# Patient Record
Sex: MALE | Race: OTHER | Employment: UNEMPLOYED | URBAN - METROPOLITAN AREA
[De-identification: names, ages, dates, MRNs, and addresses within clinical notes are randomized per-mention and may not be internally consistent; named-entity substitution may affect disease eponyms.]

---

## 2018-03-13 ENCOUNTER — APPOINTMENT (OUTPATIENT)
Dept: GENERAL RADIOLOGY | Age: 38
End: 2018-03-13
Attending: PHYSICIAN ASSISTANT
Payer: SELF-PAY

## 2018-03-13 ENCOUNTER — HOSPITAL ENCOUNTER (EMERGENCY)
Age: 38
Discharge: HOME | End: 2018-03-13
Attending: EMERGENCY MEDICINE
Payer: SELF-PAY

## 2018-03-13 VITALS
TEMPERATURE: 97.5 F | WEIGHT: 147.71 LBS | HEART RATE: 74 BPM | DIASTOLIC BLOOD PRESSURE: 70 MMHG | OXYGEN SATURATION: 100 % | RESPIRATION RATE: 16 BRPM | BODY MASS INDEX: 22.39 KG/M2 | HEIGHT: 68 IN | SYSTOLIC BLOOD PRESSURE: 105 MMHG

## 2018-03-13 DIAGNOSIS — S50.811A ABRASION OF RIGHT FOREARM, INITIAL ENCOUNTER: ICD-10-CM

## 2018-03-13 DIAGNOSIS — S50.11XA CONTUSION OF RIGHT FOREARM, INITIAL ENCOUNTER: Primary | ICD-10-CM

## 2018-03-13 PROCEDURE — 99282 EMERGENCY DEPT VISIT SF MDM: CPT

## 2018-03-13 PROCEDURE — 73090 X-RAY EXAM OF FOREARM: CPT

## 2018-03-13 PROCEDURE — 90471 IMMUNIZATION ADMIN: CPT

## 2018-03-13 PROCEDURE — 74011250636 HC RX REV CODE- 250/636: Performed by: PHYSICIAN ASSISTANT

## 2018-03-13 PROCEDURE — 90715 TDAP VACCINE 7 YRS/> IM: CPT | Performed by: PHYSICIAN ASSISTANT

## 2018-03-13 RX ORDER — IBUPROFEN 600 MG/1
600 TABLET ORAL
Qty: 20 TAB | Refills: 0 | Status: SHIPPED | OUTPATIENT
Start: 2018-03-13

## 2018-03-13 RX ADMIN — TETANUS TOXOID, REDUCED DIPHTHERIA TOXOID AND ACELLULAR PERTUSSIS VACCINE, ADSORBED 0.5 ML: 5; 2.5; 8; 8; 2.5 SUSPENSION INTRAMUSCULAR at 12:36

## 2018-03-13 NOTE — ED NOTES
Patient reports to ED c/o right forearm pain. Patient is Chilean speaking but female friend translates per request. Reports falling on ice this AM and hurting right forearm. Abrasion and tenderness noted. Emergency Department Nursing Plan of Care       The Nursing Plan of Care is developed from the Nursing assessment and Emergency Department Attending provider initial evaluation. The plan of care may be reviewed in the ED Provider note.     The Plan of Care was developed with the following considerations:   Patient / Family readiness to learn indicated by:verbalized understanding  Persons(s) to be included in education: patient  Barriers to Learning/Limitations:No    Signed     Elif Lara RN    3/13/2018   11:42 AM

## 2018-03-13 NOTE — DISCHARGE INSTRUCTIONS
Raspones (excoriaciones): Instrucciones de cuidado - [ [place] (Abrasions): Care Instructions ]  Instrucciones de cuidado  Los raspones (excoriaciones) son heridas en donde la piel ha sido frotada o arrancada. La mayoría de los raspones no penetran mucho en la piel, casie algunos pueden llegar a desprender varias capas de piel. Los raspones no suelen sangrar mucho, casie pueden supurar un líquido rosáceo. Los raspones en la lolly o en la keena pueden parecer peor de lo que son. Pueden sangrar mucho debido a la buena irrigación sanguínea de estas zonas. La mayoría de los raspones sanan phu y es posible que no requieran un vendaje. Suelen sanar dentro de 3 a 7 días. Un raspón vignesh y profundo puede tardar de 1 a 2 semanas o más en sanar. En algunos raspones se puede formar Tanja Garibay. La atención de seguimiento es kandace parte clave de kiran tratamiento y seguridad. Asegúrese de hacer y acudir a todas las citas, y llame a kiran médico si está teniendo problemas. También es kandace buena idea saber los resultados de los exámenes y mantener kandace lista de los medicamentos que [place]. ¿Cómo puede cuidarse en el hogar? · Si kiran médico le dijo cómo cuidarse la herida, siga las instrucciones de kiran médico. Si no le elvie instrucciones, siga estos consejos generales:  ¨ Lávese el raspón con agua limpia 2 veces al día. No use peróxido de hidrógeno (agua [place]) ni alcohol, los cuales pueden retrasar la sanación. ¨ Puede cubrirse el raspón con kandace capa delgada de vaselina y [place] venda no adherente. ¨ Aplíquese más vaselina y [place] la venda según sea necesario. · Mantenga elevada la mirela lesionada sobre kandace almohada en cualquier momento que se siente o se acueste gaby los 3 días siguientes. Trate de mantener la mirela por encima del nivel del corazón. [place] ayudará a reducir la hinchazón. · Sea meghan con los medicamentos. [place] los analgésicos (medicamentos para el dolor) exactamente según las indicaciones.   ¨ Si el [employer] recetó un analgésico, tómelo según las indicaciones. ¨ Si no está tomando un analgésico recetado, pregúntele a kiran médico si puede rasta tee de [employer]. ¿Cuándo debe pedir ayuda? Llame a kiran médico ahora mismo o busque atención médica inmediata si:  ? · Tiene señales de infección, tales vladislav:  ¨ Aumento del dolor, la hinchazón, el enrojecimiento o la temperatura alrededor del raspón. ¨ Vetas rojizas que salen del raspón. ¨ Pus que sale del raspón. Alda [place]. ? · El raspón comienza a sangrar, y la boris empapa el vendaje. Es normal que salgan pequeñas cantidades de [place]. ?Preste especial atención a los cambios en kiran adriel y asegúrese de comunicarse con kiran médico si el raspón no mejora día a día. ¿Dónde puede encontrar más información en inglés? Kannan Phan a [email or web address] Escriba A374 en la búsqueda para aprender más acerca de \"Raspones (excoriaciones): Instrucciones de cuidado - [ [place] (Abrasions): Care Instructions ]. \"  Revisado: 20 Edelmirane Hamper 2017  Versión del contenido: 11.4  © 8091-3377 Healthwise, Incorporated. Las instrucciones de cuidado fueron adaptadas bajo licencia por [employer] (which disclaims liability or warranty for this information). Si usted tiene [place] afección médica o sobre estas instrucciones, siempre pregunte a kiran profesional de adriel. Healthwise, Incorporated niega toda garantía o responsabilidad por kiran uso de esta información. Dolor en el Lucyann Amy: Instrucciones de cuidado - [ Arm Pain: Care Instructions ]  Instrucciones de cuidado    Usted puede lastimar kiran brazo si lo [place] en exceso o se lesiona. Raegan [place] de actividades que pudieran provocar dolor de brazo incluyen montar en bicicleta, practicar cher y hacer proyectos de reparación en el hogar. El desgaste cotidiano, especialmente al envejecer, también puede causar dolor en el brazo.  Las partes de kiran brazo con mayor probabilidad de sentir dolor son Judge Nicholas antebrazos, las [address], las richard y los dedos. En general, las lesiones leves del brazo sanan por sí solas con tratamiento en el hogar para aliviar la hinchazón y el dolor. Yolanda si tiene kandace lesión más grave, quizás necesite exámenes y [place]. La atención de seguimiento es kandace parte clave de seth tratamiento y seguridad. Asegúrese de hacer y acudir a todas las citas, y llame a seth médico si está teniendo problemas. También es kandace buena idea saber los resultados de los exámenes y mantener kandace lista de los medicamentos que [place]askia. ¿Cómo puede cuidarse en el hogar? · [employer] analgésicos (medicamentos para el dolor) exactamente vladislav le fueron indicados. ¨ Si el médico le recetó un analgésico, tómelo según las indicaciones. ¨ Si no está tomando un analgésico recetado, pregúntele a seth médico si puede arsta un medicamento de [employer]. · Descanse y proteja el Everton Schooner. Deje de hacer cualquier actividad que pudiera causarle dolor. · Colóquese hielo o kandace compresa fría en el brazo gaby 10 a 20 minutos cada vez. Póngase un paño lopez entre el hielo y la piel. · Eleve el brazo adolorido sobre kandace almohada cuando se aplica hielo o en cualquier momento que se siente o acueste gaby los 3 días siguientes. Trate de mantenerlo por encima del nivel del corazón. [place] ayudará a reducir la hinchazón. · Si seth médico le recomienda un cabestrillo para apoyar el Everton Schooner, úselo según las indicaciones. ¿Cuándo debe pedir ayuda? Llame al 911 en cualquier momento que considere que necesita atención de emergencia. Por ejemplo, llame si:  ? · Seth brazo o [address] [place] [address] frío o pálido, o cambia de color. ?Llame a seth médico ahora mismo o busque atención médica inmediata si:  ? · No puede usar el brazo. ? · Tiene señales de infección, tales vladislav:  ¨ Aumento del dolor, la hinchazón, el enrojecimiento o la temperatura. ¨ Vetas rojizas que suben o bajan por el Everton Yu. ¨ Pus que supura de kandace mirela del brazo. Dayday Schuler.    ? · Tiene hormigueo, debilitamiento o entumecimiento en el brazo. ? Preste especial atención a los cambios en kiran adriel y asegúrese de comunicarse con kiran médico si:  ? · No mejora vladislav se esperaba. ¿Dónde puede encontrar más información en inglés? Karsten gonzales [email or web address] Bailey Galarza B552 en la búsqueda para aprender Dilcia Cyrus de \"Dolor en el [place]: Instrucciones de cuidado - [ Arm Pain: Care Instructions ]. \"  Revisado: 20 [facility] Kwabena 2017  Versión del contenido: 11.4  © 1378-0466 Healthwise, Incorporated. Las instrucciones de cuidado fueron adaptadas bajo licencia por [employer] (which disclaims liability or warranty for this information). Si usted tiene [place] afección médica o sobre estas instrucciones, siempre pregunte a kiran profesional de adriel. Healthwise, Incorporated niega toda garantía o responsabilidad por kiran uso de esta información.

## 2018-03-13 NOTE — ED NOTES
YOANDY Montoya at bedside reviewing patient's discharge instructions and reviewing medications. Patient ambulatory home with female friend. Patient in no apparent distress.

## 2018-03-13 NOTE — ED PROVIDER NOTES
EMERGENCY DEPARTMENT HISTORY AND PHYSICAL EXAM    Date: 3/13/2018  Patient Name: Ct Hidalgo    History of Presenting Illness     Chief Complaint   Patient presents with    Arm Pain     fell on ice this morning, right arm pain         History Provided By: Patient    HPI: Ct Hidalgo is a 43 y.o. male with No significant past medical history who presents with R forearm pain after slipping and falling on the porch. Pt rates pain 5/10. Pt has tried nothing for the pain. Pt reports some minor scratches to the arm due to fall as well. Pt has friend to help with interpretation. Pt speaks some [place] but Maltese primary language. Pt thinks last tetanus was within last 5yrs but would like one today just in case    PCP: None        Past History     Past Medical History:  History reviewed. No pertinent past medical history. Past Surgical History:  History reviewed. No pertinent surgical history. Family History:  History reviewed. No pertinent family history. Social History:  Social History   Substance Use Topics    Smoking status: None    Smokeless tobacco: None    Alcohol use None       Allergies:  No Known Allergies      Review of Systems   Review of Systems   Musculoskeletal: Positive for arthralgias and myalgias. Skin: Positive for rash. Psychiatric/Behavioral: Positive for self-injury. All other systems reviewed and are negative. Physical Exam     Vitals:    03/13/18 1131   BP: 105/70   Pulse: 74   Resp: 16   Temp: 97.5 °F (36.4 °C)   SpO2: 100%   Weight: 67 kg (147 lb 11.3 oz)   Height: 5' 8\" (1.727 m)     Physical Exam   Constitutional: He is oriented to person, place, and time. He appears well-developed and well-nourished. No distress. HENT:   Head: Normocephalic and atraumatic. Mouth/Throat: Oropharynx is clear and moist. No oropharyngeal exudate. Eyes: Conjunctivae are normal.   Cardiovascular: Normal rate, regular rhythm and normal heart sounds.     Pulmonary/Chest: Effort normal and breath sounds normal. No respiratory distress. He has no wheezes. He has no rales. Musculoskeletal: Normal range of motion. Right wrist: Normal. He exhibits normal range of motion, no tenderness, no bony tenderness, no swelling, no effusion, no crepitus and no deformity. Right forearm: He exhibits bony tenderness (at distal forearm, minor abrasion noted as well). He exhibits no swelling, no edema and no deformity. Arms:  Neurological: He is alert and oriented to person, place, and time. Skin: Skin is warm and dry. Psychiatric: He has a normal mood and affect. His behavior is normal. Judgment and thought content normal.   Nursing note and vitals reviewed. at 12:21 PM        Diagnostic Study Results     Labs -   No results found for this or any previous visit (from the past 12 hour(s)). Radiologic Studies -   XR FOREARM RT AP/LAT   Final Result        CT Results  (Last 48 hours)    None        CXR Results  (Last 48 hours)    None        Study Result      EXAM:  XR FOREARM RT AP/LAT     INDICATION:   fall on ice today.     COMPARISON: None.     FINDINGS: Two views of the right radius and ulna demonstrate no fracture or  other acute osseous, articular or soft tissue abnormality.     IMPRESSION  IMPRESSION:  No acute abnormality. Medical Decision Making   I am the first provider for this patient. I reviewed the vital signs, available nursing notes, past medical history, past surgical history, family history and social history. Vital Signs-Reviewed the patient's vital signs. Disposition:  Discharged    DISCHARGE NOTE:   12:21 PM      Care plan outlined and precautions discussed. Patient has no new complaints, changes, or physical findings. Results of Xray were reviewed with the patient. All medications were reviewed with the patient; will d/c home. All of pt's questions and concerns were addressed.  Patient was instructed and agrees to follow up with PCP, as well as to return to the ED upon further deterioration. Patient is ready to go home. Follow-up Information     Follow up With Details Comments [address]   [address] [id number]  [address] Schedule an appointment as soon as possible for a visit in 1 week  [address]  [phone]          Current Discharge Medication List      START taking these medications    Details   ibuprofen (MOTRIN) 600 mg tablet Take 1 Tab by mouth every six (6) hours as needed for Pain. Qty: 20 Tab, Refills: 0             Provider Notes (Medical Decision Making):   DDX: fracture, contusion, abrasion, sprain    Procedures        Diagnosis     Clinical Impression:   1. Contusion of right forearm, initial encounter    2.  Abrasion of right forearm, initial encounter